# Patient Record
Sex: MALE | Race: WHITE | NOT HISPANIC OR LATINO
[De-identification: names, ages, dates, MRNs, and addresses within clinical notes are randomized per-mention and may not be internally consistent; named-entity substitution may affect disease eponyms.]

---

## 2017-01-10 PROBLEM — Z00.00 ENCOUNTER FOR PREVENTIVE HEALTH EXAMINATION: Status: ACTIVE | Noted: 2017-01-10

## 2017-02-08 ENCOUNTER — NON-APPOINTMENT (OUTPATIENT)
Age: 57
End: 2017-02-08

## 2017-02-08 ENCOUNTER — APPOINTMENT (OUTPATIENT)
Dept: CARDIOLOGY | Facility: CLINIC | Age: 57
End: 2017-02-08

## 2017-02-08 VITALS
OXYGEN SATURATION: 95 % | BODY MASS INDEX: 31.22 KG/M2 | HEIGHT: 71 IN | DIASTOLIC BLOOD PRESSURE: 88 MMHG | WEIGHT: 223 LBS | SYSTOLIC BLOOD PRESSURE: 131 MMHG | HEART RATE: 77 BPM

## 2017-02-08 DIAGNOSIS — Z82.49 FAMILY HISTORY OF ISCHEMIC HEART DISEASE AND OTHER DISEASES OF THE CIRCULATORY SYSTEM: ICD-10-CM

## 2017-02-08 DIAGNOSIS — R55 SYNCOPE AND COLLAPSE: ICD-10-CM

## 2017-02-08 DIAGNOSIS — N52.9 MALE ERECTILE DYSFUNCTION, UNSPECIFIED: ICD-10-CM

## 2017-02-08 DIAGNOSIS — D45 POLYCYTHEMIA VERA: ICD-10-CM

## 2017-02-08 DIAGNOSIS — K21.9 GASTRO-ESOPHAGEAL REFLUX DISEASE W/OUT ESOPHAGITIS: ICD-10-CM

## 2017-02-08 DIAGNOSIS — Z78.9 OTHER SPECIFIED HEALTH STATUS: ICD-10-CM

## 2018-01-22 ENCOUNTER — NON-APPOINTMENT (OUTPATIENT)
Age: 58
End: 2018-01-22

## 2018-01-22 ENCOUNTER — APPOINTMENT (OUTPATIENT)
Dept: CARDIOLOGY | Facility: CLINIC | Age: 58
End: 2018-01-22
Payer: COMMERCIAL

## 2018-01-22 VITALS
WEIGHT: 224 LBS | HEART RATE: 72 BPM | HEIGHT: 71 IN | DIASTOLIC BLOOD PRESSURE: 86 MMHG | BODY MASS INDEX: 31.36 KG/M2 | OXYGEN SATURATION: 96 % | SYSTOLIC BLOOD PRESSURE: 132 MMHG

## 2018-01-22 PROCEDURE — 99215 OFFICE O/P EST HI 40 MIN: CPT

## 2018-01-22 PROCEDURE — 93000 ELECTROCARDIOGRAM COMPLETE: CPT

## 2018-01-25 ENCOUNTER — TRANSCRIPTION ENCOUNTER (OUTPATIENT)
Age: 58
End: 2018-01-25

## 2018-01-27 ENCOUNTER — APPOINTMENT (OUTPATIENT)
Dept: CARDIOLOGY | Facility: CLINIC | Age: 58
End: 2018-01-27
Payer: COMMERCIAL

## 2018-01-27 PROCEDURE — 93880 EXTRACRANIAL BILAT STUDY: CPT

## 2018-12-10 ENCOUNTER — APPOINTMENT (OUTPATIENT)
Dept: CARDIOLOGY | Facility: CLINIC | Age: 58
End: 2018-12-10
Payer: COMMERCIAL

## 2018-12-10 ENCOUNTER — NON-APPOINTMENT (OUTPATIENT)
Age: 58
End: 2018-12-10

## 2018-12-10 VITALS
HEART RATE: 66 BPM | BODY MASS INDEX: 29.54 KG/M2 | DIASTOLIC BLOOD PRESSURE: 89 MMHG | SYSTOLIC BLOOD PRESSURE: 142 MMHG | WEIGHT: 211 LBS | HEIGHT: 71 IN | OXYGEN SATURATION: 98 %

## 2018-12-10 DIAGNOSIS — I10 ESSENTIAL (PRIMARY) HYPERTENSION: ICD-10-CM

## 2018-12-10 PROCEDURE — 93000 ELECTROCARDIOGRAM COMPLETE: CPT

## 2018-12-10 PROCEDURE — 99214 OFFICE O/P EST MOD 30 MIN: CPT

## 2018-12-10 NOTE — PHYSICAL EXAM
[General Appearance - Well Developed] : well developed [Normal Appearance] : normal appearance [Well Groomed] : well groomed [General Appearance - Well Nourished] : well nourished [No Deformities] : no deformities [General Appearance - In No Acute Distress] : no acute distress [Normal Conjunctiva] : the conjunctiva exhibited no abnormalities [Eyelids - No Xanthelasma] : the eyelids demonstrated no xanthelasmas [Normal Oral Mucosa] : normal oral mucosa [No Oral Pallor] : no oral pallor [No Oral Cyanosis] : no oral cyanosis [Normal Jugular Venous A Waves Present] : normal jugular venous A waves present [Normal Jugular Venous V Waves Present] : normal jugular venous V waves present [No Jugular Venous Lim A Waves] : no jugular venous lim A waves [Respiration, Rhythm And Depth] : normal respiratory rhythm and effort [Exaggerated Use Of Accessory Muscles For Inspiration] : no accessory muscle use [Auscultation Breath Sounds / Voice Sounds] : lungs were clear to auscultation bilaterally [Heart Rate And Rhythm] : heart rate and rhythm were normal [Heart Sounds] : normal S1 and S2 [Murmurs] : no murmurs present [Abdomen Soft] : soft [Abdomen Tenderness] : non-tender [Abdomen Mass (___ Cm)] : no abdominal mass palpated [Abnormal Walk] : normal gait [Gait - Sufficient For Exercise Testing] : the gait was sufficient for exercise testing [Nail Clubbing] : no clubbing of the fingernails [Cyanosis, Localized] : no localized cyanosis [Petechial Hemorrhages (___cm)] : no petechial hemorrhages [Skin Color & Pigmentation] : normal skin color and pigmentation [] : no rash [No Venous Stasis] : no venous stasis [Skin Lesions] : no skin lesions [No Skin Ulcers] : no skin ulcer [No Xanthoma] : no  xanthoma was observed [Oriented To Time, Place, And Person] : oriented to person, place, and time [Affect] : the affect was normal [Mood] : the mood was normal [No Anxiety] : not feeling anxious

## 2018-12-10 NOTE — DISCUSSION/SUMMARY
[FreeTextEntry1] : Javon has a history of vasovagal syncope. There is a family history of atherosclerosis. Fortunately, he feels well, and has a normal examination with a normal EKG.\par \par He continues to exhibit elevated blood pressures, and it remains unclear whether this represents reactive hypertension or essential hypertension. I will not assess this now, as he is currently taking a course of steroids. Hopefully, his headache issue will be completely resolved and he will be off steroids for several months. He will come back to the office in March, and we can consider ambulatory 24-hour blood pressure monitoring at that time.

## 2018-12-10 NOTE — HISTORY OF PRESENT ILLNESS
[FreeTextEntry1] : Chung presented to the office today for a cardiovascular evaluation. He was last seen in the office about a year ago.\par \par He is now 58 years old, with a history of polycythemia vera. He has a history of vasovagal syncope. He does not have any known structural heart disease. He has had a tendency toward elevated blood pressures, which have been primarily attributed to reactive hypertension.\par \par Over the last year, specifically over the last couple of months, he's had a debilitating problem with a headache. He has had an extensive evaluation with ENT, pulmonary, GI, immunology and neurology. All of his examinations have been unremarkable. He was recently given an empiric course of steroids and antibiotics, and almost immediately felt better. She is going to followup with the ENT after that.\par \par His blood pressure has been borderline over the last several months. He reports no symptoms of angina, heart failure or arrhythmia.

## 2019-03-04 ENCOUNTER — APPOINTMENT (OUTPATIENT)
Dept: CARDIOLOGY | Facility: CLINIC | Age: 59
End: 2019-03-04

## 2019-06-03 PROBLEM — N52.9 MALE ERECTILE DISORDER: Status: ACTIVE | Noted: 2017-02-08

## 2019-07-18 ENCOUNTER — TRANSCRIPTION ENCOUNTER (OUTPATIENT)
Age: 59
End: 2019-07-18

## 2019-10-14 ENCOUNTER — NON-APPOINTMENT (OUTPATIENT)
Age: 59
End: 2019-10-14

## 2019-10-14 ENCOUNTER — APPOINTMENT (OUTPATIENT)
Dept: CARDIOLOGY | Facility: CLINIC | Age: 59
End: 2019-10-14
Payer: COMMERCIAL

## 2019-10-14 VITALS
DIASTOLIC BLOOD PRESSURE: 90 MMHG | SYSTOLIC BLOOD PRESSURE: 141 MMHG | WEIGHT: 227 LBS | OXYGEN SATURATION: 98 % | BODY MASS INDEX: 31.66 KG/M2 | HEART RATE: 70 BPM

## 2019-10-14 PROCEDURE — 93000 ELECTROCARDIOGRAM COMPLETE: CPT

## 2019-10-14 PROCEDURE — 99214 OFFICE O/P EST MOD 30 MIN: CPT

## 2019-10-14 RX ORDER — TADALAFIL 5 MG/1
5 TABLET ORAL
Refills: 0 | Status: ACTIVE | COMMUNITY

## 2019-10-14 NOTE — DISCUSSION/SUMMARY
[FreeTextEntry1] : His dyspnea almost certainly reflects weight gain. He needs to diet significantly. The blood pressure is borderline, and has historically been suggestive of mild diastolic hypertension. He will lose the weight, and we will see where things go over all, before we get medication.\par \par To begin with, he will start checking his blood pressure at home, and send me a list in about 2 weeks. He'll come back in about 6 weeks, and we will see to what degree he was able to modify his lifestyle.

## 2019-10-14 NOTE — PHYSICAL EXAM
[General Appearance - Well Developed] : well developed [Normal Appearance] : normal appearance [Well Groomed] : well groomed [General Appearance - Well Nourished] : well nourished [No Deformities] : no deformities [General Appearance - In No Acute Distress] : no acute distress [Normal Conjunctiva] : the conjunctiva exhibited no abnormalities [Eyelids - No Xanthelasma] : the eyelids demonstrated no xanthelasmas [Normal Oral Mucosa] : normal oral mucosa [No Oral Cyanosis] : no oral cyanosis [No Oral Pallor] : no oral pallor [Normal Jugular Venous A Waves Present] : normal jugular venous A waves present [Normal Jugular Venous V Waves Present] : normal jugular venous V waves present [No Jugular Venous Lim A Waves] : no jugular venous lim A waves [Respiration, Rhythm And Depth] : normal respiratory rhythm and effort [Exaggerated Use Of Accessory Muscles For Inspiration] : no accessory muscle use [Auscultation Breath Sounds / Voice Sounds] : lungs were clear to auscultation bilaterally [Heart Rate And Rhythm] : heart rate and rhythm were normal [Heart Sounds] : normal S1 and S2 [Murmurs] : no murmurs present [Abdomen Soft] : soft [Abdomen Tenderness] : non-tender [Abdomen Mass (___ Cm)] : no abdominal mass palpated [Abnormal Walk] : normal gait [Gait - Sufficient For Exercise Testing] : the gait was sufficient for exercise testing [Nail Clubbing] : no clubbing of the fingernails [Cyanosis, Localized] : no localized cyanosis [Petechial Hemorrhages (___cm)] : no petechial hemorrhages [Skin Color & Pigmentation] : normal skin color and pigmentation [] : no rash [No Venous Stasis] : no venous stasis [Skin Lesions] : no skin lesions [No Skin Ulcers] : no skin ulcer [No Xanthoma] : no  xanthoma was observed [Oriented To Time, Place, And Person] : oriented to person, place, and time [Affect] : the affect was normal [Mood] : the mood was normal [No Anxiety] : not feeling anxious

## 2019-10-14 NOTE — HISTORY OF PRESENT ILLNESS
[FreeTextEntry1] : Chung presented to the office today for a cardiovascular evaluation. He was last seen in the office about a year ago.\par \par He is now 59 years old, with a history of polycythemia vera. He has a history of vasovagal syncope. He does not have any known structural heart disease. He has had a tendency toward elevated blood pressures, which have been primarily attributed to reactive hypertension.\par \par Last year, given headaches and other issues, he was on steroids.  I did not treat his blood pressure at that time.\par \par His symptoms all resolved, and he has been off prednisone for quite some time. Unfortunately, he has gained a bit of weight, and is up 17 pounds. He does not exercise, and does not watch his diet. In that context, he's been experiencing dyspnea, primarily with stairclimbing, or when he bends over to tie his shoes. He does not have any angina. He denies orthopnea, PND and edema. He does not typically check his blood pressure at home.

## 2020-01-29 ENCOUNTER — APPOINTMENT (OUTPATIENT)
Dept: CARDIOLOGY | Facility: CLINIC | Age: 60
End: 2020-01-29
Payer: COMMERCIAL

## 2020-01-29 ENCOUNTER — NON-APPOINTMENT (OUTPATIENT)
Age: 60
End: 2020-01-29

## 2020-01-29 VITALS
SYSTOLIC BLOOD PRESSURE: 141 MMHG | HEIGHT: 71 IN | BODY MASS INDEX: 31.08 KG/M2 | OXYGEN SATURATION: 95 % | WEIGHT: 222 LBS | DIASTOLIC BLOOD PRESSURE: 94 MMHG | HEART RATE: 77 BPM

## 2020-01-29 VITALS — DIASTOLIC BLOOD PRESSURE: 90 MMHG | SYSTOLIC BLOOD PRESSURE: 125 MMHG

## 2020-01-29 PROCEDURE — 93000 ELECTROCARDIOGRAM COMPLETE: CPT

## 2020-01-29 PROCEDURE — 99214 OFFICE O/P EST MOD 30 MIN: CPT

## 2020-01-29 NOTE — HISTORY OF PRESENT ILLNESS
[FreeTextEntry1] : Chung presented to the office today for a cardiovascular evaluation. He was last seen in the office about 3 months ago.\par \par He is now 59 years old, with a history of polycythemia vera. He has a history of vasovagal syncope. He does not have any known structural heart disease. He has had a tendency toward elevated blood pressures, which have been primarily attributed to reactive hypertension.\par \par At the last visit, we were having some issues with diastolic hypertension. He had gained a lot of weight, and I asked him to lose a few pounds, and control his blood pressure without medication. I felt that his dyspnea was also related to weight gain at that time.\par \par He's been doing well overall. He does not report chest discomfort or shortness of breath with activity. He denies orthopnea, PND and lower extremity edema. He denies palpitations, dizziness and syncope. His blood pressure was checked twice today, and it was well controlled.

## 2020-01-29 NOTE — DISCUSSION/SUMMARY
[FreeTextEntry1] : His blood pressure is initially quite high, but improves fairly nicely by the conclusion of the examination. He continues to exhibit borderline diastolic hypertension.\par \par I have asked that he try his best to lose 10 pounds over the next 3 months. Hopefully, he will be successful. I would hope to avoid the use of antihypertensive medication.

## 2020-01-29 NOTE — PHYSICAL EXAM
[Normal Appearance] : normal appearance [Well Groomed] : well groomed [General Appearance - Well Developed] : well developed [General Appearance - In No Acute Distress] : no acute distress [No Deformities] : no deformities [General Appearance - Well Nourished] : well nourished [Eyelids - No Xanthelasma] : the eyelids demonstrated no xanthelasmas [Normal Conjunctiva] : the conjunctiva exhibited no abnormalities [Normal Oral Mucosa] : normal oral mucosa [No Oral Pallor] : no oral pallor [Normal Jugular Venous A Waves Present] : normal jugular venous A waves present [Normal Jugular Venous V Waves Present] : normal jugular venous V waves present [No Oral Cyanosis] : no oral cyanosis [Respiration, Rhythm And Depth] : normal respiratory rhythm and effort [No Jugular Venous Lim A Waves] : no jugular venous lim A waves [Exaggerated Use Of Accessory Muscles For Inspiration] : no accessory muscle use [Heart Rate And Rhythm] : heart rate and rhythm were normal [Heart Sounds] : normal S1 and S2 [Auscultation Breath Sounds / Voice Sounds] : lungs were clear to auscultation bilaterally [Abdomen Tenderness] : non-tender [Murmurs] : no murmurs present [Abdomen Soft] : soft [Abdomen Mass (___ Cm)] : no abdominal mass palpated [Abnormal Walk] : normal gait [Petechial Hemorrhages (___cm)] : no petechial hemorrhages [Gait - Sufficient For Exercise Testing] : the gait was sufficient for exercise testing [Nail Clubbing] : no clubbing of the fingernails [Cyanosis, Localized] : no localized cyanosis [Skin Color & Pigmentation] : normal skin color and pigmentation [] : no rash [No Venous Stasis] : no venous stasis [No Skin Ulcers] : no skin ulcer [Skin Lesions] : no skin lesions [No Xanthoma] : no  xanthoma was observed [Affect] : the affect was normal [Mood] : the mood was normal [Oriented To Time, Place, And Person] : oriented to person, place, and time [No Anxiety] : not feeling anxious

## 2020-06-19 ENCOUNTER — NON-APPOINTMENT (OUTPATIENT)
Age: 60
End: 2020-06-19

## 2020-06-19 ENCOUNTER — APPOINTMENT (OUTPATIENT)
Dept: CARDIOLOGY | Facility: CLINIC | Age: 60
End: 2020-06-19
Payer: COMMERCIAL

## 2020-06-19 VITALS
BODY MASS INDEX: 29.26 KG/M2 | WEIGHT: 209 LBS | DIASTOLIC BLOOD PRESSURE: 85 MMHG | HEIGHT: 71 IN | HEART RATE: 73 BPM | OXYGEN SATURATION: 97 % | SYSTOLIC BLOOD PRESSURE: 126 MMHG

## 2020-06-19 PROCEDURE — 99214 OFFICE O/P EST MOD 30 MIN: CPT

## 2020-06-19 PROCEDURE — 93000 ELECTROCARDIOGRAM COMPLETE: CPT

## 2020-06-19 NOTE — PHYSICAL EXAM
[General Appearance - Well Developed] : well developed [Normal Appearance] : normal appearance [Well Groomed] : well groomed [No Deformities] : no deformities [General Appearance - Well Nourished] : well nourished [General Appearance - In No Acute Distress] : no acute distress [Normal Conjunctiva] : the conjunctiva exhibited no abnormalities [Eyelids - No Xanthelasma] : the eyelids demonstrated no xanthelasmas [No Oral Cyanosis] : no oral cyanosis [Normal Jugular Venous A Waves Present] : normal jugular venous A waves present [No Oral Pallor] : no oral pallor [Normal Oral Mucosa] : normal oral mucosa [No Jugular Venous Lim A Waves] : no jugular venous lim A waves [Normal Jugular Venous V Waves Present] : normal jugular venous V waves present [Exaggerated Use Of Accessory Muscles For Inspiration] : no accessory muscle use [Respiration, Rhythm And Depth] : normal respiratory rhythm and effort [Auscultation Breath Sounds / Voice Sounds] : lungs were clear to auscultation bilaterally [Heart Sounds] : normal S1 and S2 [Heart Rate And Rhythm] : heart rate and rhythm were normal [Murmurs] : no murmurs present [Abdomen Soft] : soft [Abdomen Tenderness] : non-tender [Abdomen Mass (___ Cm)] : no abdominal mass palpated [Abnormal Walk] : normal gait [Gait - Sufficient For Exercise Testing] : the gait was sufficient for exercise testing [Nail Clubbing] : no clubbing of the fingernails [Cyanosis, Localized] : no localized cyanosis [Petechial Hemorrhages (___cm)] : no petechial hemorrhages [No Venous Stasis] : no venous stasis [] : no rash [Skin Color & Pigmentation] : normal skin color and pigmentation [No Skin Ulcers] : no skin ulcer [Skin Lesions] : no skin lesions [No Xanthoma] : no  xanthoma was observed [Oriented To Time, Place, And Person] : oriented to person, place, and time [Affect] : the affect was normal [Mood] : the mood was normal [No Anxiety] : not feeling anxious

## 2020-06-19 NOTE — HISTORY OF PRESENT ILLNESS
[FreeTextEntry1] : Chung presented to the office today for a cardiovascular evaluation. He was last seen in the office about 6 months ago.\par \par He is now 60 years old, with a history of polycythemia vera. He has a history of vasovagal syncope. He does not have any known structural heart disease. He has had a tendency toward elevated blood pressures, which have been primarily attributed to reactive hypertension.\par \par At the last visit, we were having some issues with diastolic hypertension. We agreed to try to avoid the use of antihypertensive medication, and pursue a strategy of weight loss.\par \par He has lost 13 pounds over the last 6 months. His blood pressure today is better.  He does not report chest discomfort or shortness of breath with activity. He denies orthopnea, PND and lower extremity edema. He denies palpitations, dizziness and syncope.

## 2020-06-19 NOTE — DISCUSSION/SUMMARY
[FreeTextEntry1] : He continues to exhibit borderline diastolic hypertension.\par \par I have asked that he try his best to lose 10 pounds over the next 6 months. Hopefully, he will be successful. I would hope to avoid the use of antihypertensive medication.

## 2020-12-15 ENCOUNTER — APPOINTMENT (OUTPATIENT)
Dept: CARDIOLOGY | Facility: CLINIC | Age: 60
End: 2020-12-15
Payer: COMMERCIAL

## 2020-12-15 ENCOUNTER — NON-APPOINTMENT (OUTPATIENT)
Age: 60
End: 2020-12-15

## 2020-12-15 VITALS
HEART RATE: 77 BPM | BODY MASS INDEX: 28.56 KG/M2 | HEIGHT: 71 IN | OXYGEN SATURATION: 98 % | SYSTOLIC BLOOD PRESSURE: 135 MMHG | WEIGHT: 204 LBS | DIASTOLIC BLOOD PRESSURE: 82 MMHG

## 2020-12-15 PROCEDURE — 99214 OFFICE O/P EST MOD 30 MIN: CPT

## 2020-12-15 PROCEDURE — 99072 ADDL SUPL MATRL&STAF TM PHE: CPT

## 2020-12-15 PROCEDURE — 93000 ELECTROCARDIOGRAM COMPLETE: CPT

## 2020-12-15 NOTE — HISTORY OF PRESENT ILLNESS
[FreeTextEntry1] : Chung presented to the office today for a cardiovascular evaluation. He was last seen in the office about 6 months ago.\par \par He is now 60 years old, with a history of polycythemia vera. He has a history of vasovagal syncope. He does not have any known structural heart disease. He has had a tendency toward elevated blood pressures, which have been primarily attributed to reactive hypertension.\par \par At the last visit, we were having some issues with diastolic hypertension. We agreed to try to avoid the use of antihypertensive medication, and pursue a strategy of weight loss.\par \par He had lost 13 pounds over 6 months, and now has lost 5 more. His blood pressure today is reasonable. He does not report chest discomfort or shortness of breath with activity. He denies orthopnea, PND and lower extremity edema. He denies palpitations, dizziness and syncope.  He has had some orthopedic issues, and is considering steroid injections, and to course of anti-inflammatories.  He has been somewhat sedentary because of these issues.

## 2020-12-15 NOTE — DISCUSSION/SUMMARY
[FreeTextEntry1] : He continues to exhibit borderline hypertension.\par \par I have asked that he try his best to lose 5 pounds over the next 6 months. Hopefully, he will be successful. I would hope to avoid the use of antihypertensive medication.

## 2020-12-27 ENCOUNTER — TRANSCRIPTION ENCOUNTER (OUTPATIENT)
Age: 60
End: 2020-12-27

## 2021-01-09 ENCOUNTER — TRANSCRIPTION ENCOUNTER (OUTPATIENT)
Age: 61
End: 2021-01-09

## 2022-01-19 ENCOUNTER — APPOINTMENT (OUTPATIENT)
Dept: CARDIOLOGY | Facility: CLINIC | Age: 62
End: 2022-01-19
Payer: COMMERCIAL

## 2022-01-19 ENCOUNTER — NON-APPOINTMENT (OUTPATIENT)
Age: 62
End: 2022-01-19

## 2022-01-19 VITALS
HEART RATE: 77 BPM | SYSTOLIC BLOOD PRESSURE: 151 MMHG | WEIGHT: 209 LBS | BODY MASS INDEX: 29.26 KG/M2 | DIASTOLIC BLOOD PRESSURE: 103 MMHG | OXYGEN SATURATION: 98 % | HEIGHT: 71 IN

## 2022-01-19 VITALS — SYSTOLIC BLOOD PRESSURE: 150 MMHG | DIASTOLIC BLOOD PRESSURE: 90 MMHG

## 2022-01-19 PROCEDURE — 99214 OFFICE O/P EST MOD 30 MIN: CPT

## 2022-01-19 PROCEDURE — 93000 ELECTROCARDIOGRAM COMPLETE: CPT

## 2022-01-19 NOTE — DISCUSSION/SUMMARY
[FreeTextEntry1] : He continues to exhibit what I hope is simply reactive hypertension.\par \par He will check his blood pressure at home, and we can try to determine whether this is something we need to be more concerned about.  In the past, his pressures were more borderline.  Hopefully, through lifestyle modifications with weight loss, any blood pressure elevations can be handled conservatively he will send me a list of blood pressures in a few weeks, and we will make a decision.\par \par If all is well, he will see me in 1 year.  I have requested his most recent blood work for my review.

## 2022-01-19 NOTE — HISTORY OF PRESENT ILLNESS
[FreeTextEntry1] : Chung presented to the office today for a cardiovascular evaluation. He was last seen in the office about 1 year ago.\par \par He is now 61 years old, with a history of polycythemia vera. He has a history of vasovagal syncope. He does not have any known structural heart disease. He has had a tendency toward elevated blood pressures, which have been primarily attributed to reactive hypertension.\par \par In 2020, we were having some issues with diastolic hypertension. We agreed to try to avoid the use of antihypertensive medication, and pursue a strategy of weight loss. He had lost 20 pounds and his BP seemed  reasonable. \par \par He presents to the office today having been feeling well overall.  He does not report chest discomfort or shortness of breath with activity. He denies orthopnea, PND and lower extremity edema. He denies palpitations, dizziness and syncope.  He remains with some orthopedic issues, and is undergoing evaluation.  He has been somewhat sedentary because of these issues. His hematologist has felt that he may not have PCV at all, and he will go back in the near future.

## 2022-03-14 ENCOUNTER — APPOINTMENT (OUTPATIENT)
Dept: CARDIOLOGY | Facility: CLINIC | Age: 62
End: 2022-03-14
Payer: COMMERCIAL

## 2022-03-14 ENCOUNTER — NON-APPOINTMENT (OUTPATIENT)
Age: 62
End: 2022-03-14

## 2022-03-14 VITALS
DIASTOLIC BLOOD PRESSURE: 87 MMHG | BODY MASS INDEX: 28.56 KG/M2 | HEART RATE: 73 BPM | HEIGHT: 71 IN | SYSTOLIC BLOOD PRESSURE: 133 MMHG | WEIGHT: 204 LBS | OXYGEN SATURATION: 97 %

## 2022-03-14 DIAGNOSIS — I10 ESSENTIAL (PRIMARY) HYPERTENSION: ICD-10-CM

## 2022-03-14 PROCEDURE — 93000 ELECTROCARDIOGRAM COMPLETE: CPT

## 2022-03-14 PROCEDURE — 99214 OFFICE O/P EST MOD 30 MIN: CPT

## 2022-03-14 RX ORDER — AMLODIPINE BESYLATE 10 MG/1
10 TABLET ORAL DAILY
Qty: 90 | Refills: 3 | Status: ACTIVE | COMMUNITY
Start: 2022-02-23 | End: 1900-01-01

## 2022-03-14 NOTE — DISCUSSION/SUMMARY
[FreeTextEntry1] : We are now treating essential hypertension.  Hopefully, he will respond nicely to treatment.  He will increase amlodipine from 5 up to 10 mg daily.  He will check his blood pressure twice daily over the next several weeks, and then send me a list of those blood pressure readings for my review.  He will see me again in about 6 weeks.

## 2022-03-14 NOTE — HISTORY OF PRESENT ILLNESS
[FreeTextEntry1] : Chung presented to the office today for a cardiovascular evaluation. He was last seen in the office about 1 year ago.\par \par He is now 61 years old, with a history of polycythemia vera. He has a history of vasovagal syncope. He does not have any known structural heart disease. He has had a tendency toward elevated blood pressures, which have been primarily attributed to reactive hypertension.\par \par In 2020, we were having some issues with diastolic hypertension. We agreed to try to avoid the use of antihypertensive medication, and pursue a strategy of weight loss. He had lost 20 pounds and his BP seemed  reasonable. \par \par I saw him more recently in January, and his blood pressure remained elevated.  He called more recently and reported that his blood pressure has been reproducibly in the 140s over 90s, and we decided to start treatment with amlodipine 5 mg daily.\par \par He has been taking the amlodipine without difficulty.  His blood pressure has been in the 130s over 80s.  He does not report any symptoms suggestive of angina, heart failure or arrhythmia.

## 2022-05-03 ENCOUNTER — NON-APPOINTMENT (OUTPATIENT)
Age: 62
End: 2022-05-03

## 2022-05-03 ENCOUNTER — APPOINTMENT (OUTPATIENT)
Dept: CARDIOLOGY | Facility: CLINIC | Age: 62
End: 2022-05-03
Payer: COMMERCIAL

## 2022-05-03 VITALS
WEIGHT: 212 LBS | OXYGEN SATURATION: 98 % | HEIGHT: 71 IN | HEART RATE: 76 BPM | SYSTOLIC BLOOD PRESSURE: 136 MMHG | BODY MASS INDEX: 29.68 KG/M2 | DIASTOLIC BLOOD PRESSURE: 83 MMHG

## 2022-05-03 PROCEDURE — 99214 OFFICE O/P EST MOD 30 MIN: CPT

## 2022-05-03 PROCEDURE — 93000 ELECTROCARDIOGRAM COMPLETE: CPT

## 2022-05-03 RX ORDER — HYDROCHLOROTHIAZIDE 25 MG/1
25 TABLET ORAL DAILY
Qty: 90 | Refills: 3 | Status: ACTIVE | COMMUNITY
Start: 2022-05-03 | End: 1900-01-01

## 2022-05-03 NOTE — HISTORY OF PRESENT ILLNESS
[FreeTextEntry1] : Chung presented to the office today for a cardiovascular evaluation. He was last seen in the office about 6 weeks ago.\par \par He is now 62 years old, with a history of polycythemia vera. He has a history of vasovagal syncope. He does not have any known structural heart disease. He has had a tendency toward elevated blood pressures, which have been primarily attributed to reactive hypertension.\par \par In 2020, we were having some issues with diastolic hypertension. We agreed to try to avoid the use of antihypertensive medication, and pursue a strategy of weight loss. He had lost 20 pounds and his BP seemed  reasonable. \par \par I saw him more recently in January, and his blood pressure remained elevated.  He called more recently and reported that his blood pressure has been reproducibly in the 140s over 90s, and we decided to start treatment with amlodipine 5 mg daily.  At the time of the last visit, I increased it further to 10 mg daily.\par \par He has been taking the amlodipine without difficulty.  His blood pressure has been in the 130s over 80s.  He does not report any symptoms suggestive of angina, heart failure or arrhythmia.  He does note that his legs have been somewhat more puffy over the last few weeks.

## 2022-05-03 NOTE — DISCUSSION/SUMMARY
[FreeTextEntry1] : We are now treating essential hypertension.  Hopefully, he will respond nicely to treatment.  \par \par He will continue amlodipine.  I think the degree of puffiness in his legs is quite minimal, and should be readily tolerable.  I will add hydrochlorothiazide 25 mg daily to his regimen.  He will come to the office in about 2 weeks for blood work, and to drop off a list of blood pressure readings.  We will make further adjustments to his medications on an as-needed basis.\par \par I hope to be able to see him next in about 6 months.

## 2022-05-09 ENCOUNTER — APPOINTMENT (OUTPATIENT)
Dept: ORTHOPEDIC SURGERY | Facility: CLINIC | Age: 62
End: 2022-05-09

## 2022-05-23 DIAGNOSIS — E87.6 HYPOKALEMIA: ICD-10-CM

## 2022-05-23 LAB
ANION GAP SERPL CALC-SCNC: 13 MMOL/L
BASOPHILS # BLD AUTO: 0.04 K/UL
BASOPHILS NFR BLD AUTO: 0.8 %
BUN SERPL-MCNC: 16 MG/DL
CALCIUM SERPL-MCNC: 9.7 MG/DL
CHLORIDE SERPL-SCNC: 102 MMOL/L
CO2 SERPL-SCNC: 25 MMOL/L
CREAT SERPL-MCNC: 1.02 MG/DL
EGFR: 84 ML/MIN/1.73M2
EOSINOPHIL # BLD AUTO: 0.18 K/UL
EOSINOPHIL NFR BLD AUTO: 3.7 %
GLUCOSE SERPL-MCNC: 103 MG/DL
HCT VFR BLD CALC: 46.4 %
HGB BLD-MCNC: 16 G/DL
IMM GRANULOCYTES NFR BLD AUTO: 1.6 %
LYMPHOCYTES # BLD AUTO: 1.67 K/UL
LYMPHOCYTES NFR BLD AUTO: 34 %
MAN DIFF?: NORMAL
MCHC RBC-ENTMCNC: 29.1 PG
MCHC RBC-ENTMCNC: 34.5 GM/DL
MCV RBC AUTO: 84.5 FL
MONOCYTES # BLD AUTO: 0.61 K/UL
MONOCYTES NFR BLD AUTO: 12.4 %
NEUTROPHILS # BLD AUTO: 2.33 K/UL
NEUTROPHILS NFR BLD AUTO: 47.5 %
PLATELET # BLD AUTO: 270 K/UL
POTASSIUM SERPL-SCNC: 3.4 MMOL/L
RBC # BLD: 5.49 M/UL
RBC # FLD: 12.8 %
SODIUM SERPL-SCNC: 140 MMOL/L
WBC # FLD AUTO: 4.91 K/UL

## 2022-07-25 ENCOUNTER — RX ONLY (RX ONLY)
Age: 62
End: 2022-07-25

## 2022-07-25 ENCOUNTER — OFFICE (OUTPATIENT)
Dept: URBAN - METROPOLITAN AREA CLINIC 70 | Facility: CLINIC | Age: 62
Setting detail: OPHTHALMOLOGY
End: 2022-07-25
Payer: COMMERCIAL

## 2022-07-25 DIAGNOSIS — H40.013: ICD-10-CM

## 2022-07-25 DIAGNOSIS — H16.223: ICD-10-CM

## 2022-07-25 DIAGNOSIS — H35.54: ICD-10-CM

## 2022-07-25 PROCEDURE — 92004 COMPRE OPH EXAM NEW PT 1/>: CPT | Performed by: OPHTHALMOLOGY

## 2022-07-25 PROCEDURE — 92250 FUNDUS PHOTOGRAPHY W/I&R: CPT | Performed by: OPHTHALMOLOGY

## 2022-07-25 ASSESSMENT — REFRACTION_CURRENTRX
OS_ADD: +2.25
OD_OVR_VA: 20/
OS_CYLINDER: -0.75
OD_CYLINDER: -1.50
OS_SPHERE: -4.25
OD_AXIS: 100
OD_SPHERE: -3.50
OD_ADD: +2.25
OS_OVR_VA: 20/
OS_AXIS: 104

## 2022-07-25 ASSESSMENT — SPHEQUIV_DERIVED
OD_SPHEQUIV: -3.75
OS_SPHEQUIV: -4

## 2022-07-25 ASSESSMENT — KERATOMETRY
OS_K2POWER_DIOPTERS: 44.25
OS_AXISANGLE_DEGREES: 016
OD_K1POWER_DIOPTERS: 43.50
OS_K1POWER_DIOPTERS: 43.75
OD_K2POWER_DIOPTERS: 44.75
OD_AXISANGLE_DEGREES: 014

## 2022-07-25 ASSESSMENT — SUPERFICIAL PUNCTATE KERATITIS (SPK)
OD_SPK: 1+
OS_SPK: 1+

## 2022-07-25 ASSESSMENT — VISUAL ACUITY
OD_BCVA: 20/20-2
OS_BCVA: 20/20-2

## 2022-07-25 ASSESSMENT — REFRACTION_AUTOREFRACTION
OD_CYLINDER: -1.50
OS_AXIS: 104
OS_CYLINDER: -0.50
OD_SPHERE: -3.00
OD_AXIS: 106
OS_SPHERE: -3.75

## 2022-07-25 ASSESSMENT — CONFRONTATIONAL VISUAL FIELD TEST (CVF)
OD_FINDINGS: FULL
OS_FINDINGS: FULL

## 2022-07-25 ASSESSMENT — AXIALLENGTH_DERIVED
OS_AL: 25.0517
OD_AL: 24.8912

## 2023-01-30 ENCOUNTER — APPOINTMENT (OUTPATIENT)
Dept: ORTHOPEDIC SURGERY | Facility: CLINIC | Age: 63
End: 2023-01-30
Payer: COMMERCIAL

## 2023-01-30 VITALS — BODY MASS INDEX: 29.68 KG/M2 | HEIGHT: 71 IN | WEIGHT: 212 LBS

## 2023-01-30 DIAGNOSIS — M54.50 LOW BACK PAIN, UNSPECIFIED: ICD-10-CM

## 2023-01-30 DIAGNOSIS — G89.29 LOW BACK PAIN, UNSPECIFIED: ICD-10-CM

## 2023-01-30 DIAGNOSIS — M51.26 OTHER INTERVERTEBRAL DISC DISPLACEMENT, LUMBAR REGION: ICD-10-CM

## 2023-01-30 PROCEDURE — 72100 X-RAY EXAM L-S SPINE 2/3 VWS: CPT

## 2023-01-30 PROCEDURE — 72170 X-RAY EXAM OF PELVIS: CPT

## 2023-01-30 PROCEDURE — 99214 OFFICE O/P EST MOD 30 MIN: CPT

## 2023-01-30 RX ORDER — ROSUVASTATIN CALCIUM 10 MG/1
10 TABLET, FILM COATED ORAL
Refills: 0 | Status: ACTIVE | COMMUNITY

## 2023-01-30 RX ORDER — ASPIRIN ENTERIC COATED TABLETS 81 MG 81 MG/1
81 TABLET, DELAYED RELEASE ORAL
Refills: 0 | Status: COMPLETED | COMMUNITY
End: 2023-01-30

## 2023-01-30 RX ORDER — POTASSIUM CHLORIDE 1500 MG/1
20 TABLET, FILM COATED, EXTENDED RELEASE ORAL
Qty: 90 | Refills: 3 | Status: COMPLETED | COMMUNITY
Start: 2022-05-23 | End: 2023-01-30

## 2023-01-30 NOTE — REASON FOR VISIT
[FreeTextEntry2] : Patient complains of continued Lower Back and Right Hip pain since his last visit. Patient states that pain has recently increased. Some radiating discomfort down his R Leg. Pain increases when standing and walking. Patient takes Advil for the pain which has been helpful. Patient would like a new prescription for PT.

## 2023-01-30 NOTE — HISTORY OF PRESENT ILLNESS
[Gradual] : gradual [3] : 3 [Dull/Aching] : dull/aching [Intermittent] : intermittent [Meds] : meds [Standing] : standing [Walking] : walking [] : Post Surgical Visit: no [FreeTextEntry7] : R Leg [FreeTextEntry9] : Advil [de-identified] : 03/16/22 [de-identified] : Dr. Tamayo

## 2023-01-30 NOTE — PHYSICAL EXAM
[No bony abnormalities] : No bony abnormalities [Disc space narrowing] : Disc space narrowing [AP] : anteroposterior [There are no fractures, subluxations or dislocations. No significant abnormalities are seen] : There are no fractures, subluxations or dislocations. No significant abnormalities are seen [Mild arthritis (Tonnis Grade 1)] : Mild arthritis (Tonnis Grade 1) [Extension] : extension [] : non-antalgic